# Patient Record
Sex: MALE | Race: BLACK OR AFRICAN AMERICAN | URBAN - METROPOLITAN AREA
[De-identification: names, ages, dates, MRNs, and addresses within clinical notes are randomized per-mention and may not be internally consistent; named-entity substitution may affect disease eponyms.]

---

## 2024-09-26 ENCOUNTER — OFFICE VISIT (OUTPATIENT)
Dept: URGENT CARE | Age: 29
End: 2024-09-26
Payer: COMMERCIAL

## 2024-09-26 VITALS
DIASTOLIC BLOOD PRESSURE: 65 MMHG | OXYGEN SATURATION: 97 % | TEMPERATURE: 98.3 F | RESPIRATION RATE: 18 BRPM | HEART RATE: 70 BPM | SYSTOLIC BLOOD PRESSURE: 107 MMHG

## 2024-09-26 DIAGNOSIS — Z11.3 SCREEN FOR STD (SEXUALLY TRANSMITTED DISEASE): Primary | ICD-10-CM

## 2024-09-26 PROCEDURE — 87591 N.GONORRHOEAE DNA AMP PROB: CPT

## 2024-09-26 PROCEDURE — 87661 TRICHOMONAS VAGINALIS AMPLIF: CPT

## 2024-09-26 PROCEDURE — 87491 CHLMYD TRACH DNA AMP PROBE: CPT

## 2024-09-26 RX ORDER — CEFTRIAXONE 500 MG/1
500 INJECTION, POWDER, FOR SOLUTION INTRAMUSCULAR; INTRAVENOUS ONCE
Status: COMPLETED | OUTPATIENT
Start: 2024-09-26 | End: 2024-09-26

## 2024-09-26 RX ORDER — METRONIDAZOLE 500 MG/1
500 TABLET ORAL 2 TIMES DAILY
Qty: 14 TABLET | Refills: 0 | Status: SHIPPED | OUTPATIENT
Start: 2024-09-26 | End: 2024-10-03

## 2024-09-26 RX ORDER — DOXYCYCLINE 100 MG/1
100 CAPSULE ORAL 2 TIMES DAILY
Qty: 14 CAPSULE | Refills: 0 | Status: SHIPPED | OUTPATIENT
Start: 2024-09-26 | End: 2024-10-03

## 2024-09-26 ASSESSMENT — PATIENT HEALTH QUESTIONNAIRE - PHQ9
SUM OF ALL RESPONSES TO PHQ9 QUESTIONS 1 AND 2: 0
2. FEELING DOWN, DEPRESSED OR HOPELESS: NOT AT ALL
1. LITTLE INTEREST OR PLEASURE IN DOING THINGS: NOT AT ALL

## 2024-09-26 NOTE — PROGRESS NOTES
Subjective   Patient ID: Dwayne Fischer is a 29 y.o. male. They present today with a chief complaint of std check (Clear Discharge from penis, no odor. 1 day. Patient says he's been sexually active and wants to confirm no STD's. Recently travelled to [facility]. ).    History of Present Illness  Patient is a 29-year-old -American male, no significant past medical history, presented to clinic for chief complaint of penile discharge.  Patient is reporting 1 day history of penile discharge after being in the [facility].  He denies any genital lesions rashes or ulcerations.  No testicular pain or swelling.  No further complaints.  Denies any abdominal pain, nausea, vomiting.          Past Medical History  Allergies as of 09/26/2024    (No Known Allergies)       (Not in a hospital admission)         History reviewed. No pertinent past medical history.    History reviewed. No pertinent surgical history.     reports that he has never smoked. He does not have any smokeless tobacco history on file. He reports current drug use. Drug: Marijuana.    Review of Systems  Review of Systems          All review of systems negative unless stated in HPI.                      Objective    Vitals:    09/26/24 1309   BP: 107/65   BP Location: Left arm   Patient Position: Sitting   Pulse: 70   Resp: 18   Temp: 36.8 °C (98.3 °F)   SpO2: 97%     No LMP for male patient.    Physical Exam  General: Vitals Noted. No distress. Normocephalic.     HEENT: TMs normal, EOMI, normal conjunctiva, patent nares, Normal OP    Neck: Supple with no adenopathy.     Cardiac: Regular Rate and Rhythm. No murmur.     Pulmonary: Equal breath sounds bilaterally. No wheezes, rhonchi, or rales.    Abdomen: Soft, non-tender, with normal bowel sounds.     Musculoskeletal: Moves all extremities, no effusion, no edema.     Skin: No obvious rashes.  Procedures    Point of Care Test & Imaging Results from this visit    No results found.    Diagnostic study results (if  any) were reviewed by Jean Pierre Lopes PA-C.    Assessment/Plan   Allergies, medications, history, and pertinent labs/EKGs/Imaging reviewed by Jean Pierre Lopes PA-C.     Medical Decision Making  Patient was seen eval in the clinic for chief complaint of STD screening due to penile discharge.  On exam patient is nontoxic well-appearing respite comfortably no acute distress.  Vital signs are stable, afebrile.  Chest is clear, heart is regular, belly soft and nontender.  Urine obtained will be sent for STD testing.  Patient is requesting to be treated for everything today.  I had a conversation with the patient regarding that I do not feel comfortable treating him empirically as we do not know what we are treating.  He insist on the antibiotics and therefore 500 mg Rocephin IM was presented in the clinic and he was provided doxycycline 100 mg twice daily x 7 days and metronidazole 500 mg twice daily x 7 days.  Will be discharged home at this time.  Advise he refrain from sexual intercourse until he completes treatment.  Reviewed my impression, plan, strict return precautions with the patient.  He expresses understanding and agreement plan of care.    Orders and Diagnoses  Diagnoses and all orders for this visit:  Screen for STD (sexually transmitted disease)  -     cefTRIAXone (Rocephin) vial 500 mg  -     metroNIDAZOLE (Flagyl) 500 mg tablet; Take 1 tablet (500 mg) by mouth 2 times a day for 7 days.  -     doxycycline (Vibramycin) 100 mg capsule; Take 1 capsule (100 mg) by mouth 2 times a day for 7 days. Take with at least 8 ounces (large glass) of water, do not lie down for 30 minutes after  -     C. Trachomatis / N. Gonorrhoeae, Amplified Detection  -     Trichomonas vaginalis, Amplified        Medical Admin Record      Follow Up Instructions  No follow-ups on file.    Patient disposition: Home    Electronically signed by Jean Pierre Lopes PA-C  1:16 PM

## 2024-09-27 LAB
C TRACH RRNA SPEC QL NAA+PROBE: NEGATIVE
N GONORRHOEA DNA SPEC QL PROBE+SIG AMP: NEGATIVE
T VAGINALIS RRNA SPEC QL NAA+PROBE: NEGATIVE